# Patient Record
Sex: FEMALE | Race: WHITE
[De-identification: names, ages, dates, MRNs, and addresses within clinical notes are randomized per-mention and may not be internally consistent; named-entity substitution may affect disease eponyms.]

---

## 2021-05-22 ENCOUNTER — HOSPITAL ENCOUNTER (OUTPATIENT)
Dept: HOSPITAL 8 - ED | Age: 71
Setting detail: OBSERVATION
LOS: 1 days | Discharge: HOME | End: 2021-05-23
Attending: INTERNAL MEDICINE | Admitting: INTERNAL MEDICINE
Payer: SELF-PAY

## 2021-05-22 VITALS — SYSTOLIC BLOOD PRESSURE: 119 MMHG | DIASTOLIC BLOOD PRESSURE: 72 MMHG

## 2021-05-22 VITALS — DIASTOLIC BLOOD PRESSURE: 92 MMHG | SYSTOLIC BLOOD PRESSURE: 154 MMHG

## 2021-05-22 VITALS — DIASTOLIC BLOOD PRESSURE: 82 MMHG | SYSTOLIC BLOOD PRESSURE: 166 MMHG

## 2021-05-22 VITALS — SYSTOLIC BLOOD PRESSURE: 99 MMHG | DIASTOLIC BLOOD PRESSURE: 63 MMHG

## 2021-05-22 VITALS — HEIGHT: 65 IN | BODY MASS INDEX: 22.11 KG/M2 | WEIGHT: 132.72 LBS

## 2021-05-22 VITALS — DIASTOLIC BLOOD PRESSURE: 68 MMHG | SYSTOLIC BLOOD PRESSURE: 114 MMHG

## 2021-05-22 DIAGNOSIS — J96.11: ICD-10-CM

## 2021-05-22 DIAGNOSIS — M79.7: ICD-10-CM

## 2021-05-22 DIAGNOSIS — F41.8: ICD-10-CM

## 2021-05-22 DIAGNOSIS — Z87.891: ICD-10-CM

## 2021-05-22 DIAGNOSIS — Z79.02: ICD-10-CM

## 2021-05-22 DIAGNOSIS — I25.2: ICD-10-CM

## 2021-05-22 DIAGNOSIS — Z99.81: ICD-10-CM

## 2021-05-22 DIAGNOSIS — Z95.5: ICD-10-CM

## 2021-05-22 DIAGNOSIS — E78.5: ICD-10-CM

## 2021-05-22 DIAGNOSIS — I10: ICD-10-CM

## 2021-05-22 DIAGNOSIS — M31.30: ICD-10-CM

## 2021-05-22 DIAGNOSIS — Z79.899: ICD-10-CM

## 2021-05-22 DIAGNOSIS — I73.00: ICD-10-CM

## 2021-05-22 DIAGNOSIS — R04.0: Primary | ICD-10-CM

## 2021-05-22 DIAGNOSIS — Z85.41: ICD-10-CM

## 2021-05-22 DIAGNOSIS — D69.9: ICD-10-CM

## 2021-05-22 DIAGNOSIS — I25.10: ICD-10-CM

## 2021-05-22 DIAGNOSIS — R11.0: ICD-10-CM

## 2021-05-22 DIAGNOSIS — J44.9: ICD-10-CM

## 2021-05-22 DIAGNOSIS — Z90.710: ICD-10-CM

## 2021-05-22 LAB
BASOPHILS # BLD AUTO: 0 X10^3/UL (ref 0–0.1)
BASOPHILS NFR BLD AUTO: 1 % (ref 0–1)
EOSINOPHIL # BLD AUTO: 0.1 X10^3/UL (ref 0–0.4)
EOSINOPHIL NFR BLD AUTO: 2 % (ref 1–7)
ERYTHROCYTE [DISTWIDTH] IN BLOOD BY AUTOMATED COUNT: 13 % (ref 9.6–15.2)
LYMPHOCYTES # BLD AUTO: 1.3 X10^3/UL (ref 1–3.4)
LYMPHOCYTES NFR BLD AUTO: 22 % (ref 22–44)
MCH RBC QN AUTO: 33.6 PG (ref 27–34.8)
MCHC RBC AUTO-ENTMCNC: 34 G/DL (ref 32.4–35.8)
MD: NO
MONOCYTES # BLD AUTO: 0.6 X10^3/UL (ref 0.2–0.8)
MONOCYTES NFR BLD AUTO: 10 % (ref 2–9)
NEUTROPHILS # BLD AUTO: 3.9 X10^3/UL (ref 1.8–6.8)
NEUTROPHILS NFR BLD AUTO: 66 % (ref 42–75)
PLATELET # BLD AUTO: 200 X10^3/UL (ref 130–400)
PMV BLD AUTO: 8.1 FL (ref 7.4–10.4)
RBC # BLD AUTO: 3.7 X10^6/UL (ref 3.82–5.3)
TROPONIN I SERPL-MCNC: 0.04 NG/ML (ref 0–0.04)

## 2021-05-22 PROCEDURE — 85025 COMPLETE CBC W/AUTO DIFF WBC: CPT

## 2021-05-22 PROCEDURE — 36415 COLL VENOUS BLD VENIPUNCTURE: CPT

## 2021-05-22 PROCEDURE — 84484 ASSAY OF TROPONIN QUANT: CPT

## 2021-05-22 PROCEDURE — 93005 ELECTROCARDIOGRAM TRACING: CPT

## 2021-05-22 PROCEDURE — G0378 HOSPITAL OBSERVATION PER HR: HCPCS

## 2021-05-22 PROCEDURE — 94640 AIRWAY INHALATION TREATMENT: CPT

## 2021-05-22 PROCEDURE — 99284 EMERGENCY DEPT VISIT MOD MDM: CPT

## 2021-05-22 RX ADMIN — ATORVASTATIN CALCIUM SCH MG: 40 TABLET, FILM COATED ORAL at 10:30

## 2021-05-22 RX ADMIN — CEFDINIR SCH MG: 300 CAPSULE ORAL at 21:08

## 2021-05-22 RX ADMIN — ALBUTEROL SULFATE SCH MG: 2.5 SOLUTION RESPIRATORY (INHALATION) at 20:25

## 2021-05-22 RX ADMIN — PANTOPRAZOLE SODIUM SCH MG: 40 TABLET, DELAYED RELEASE ORAL at 10:30

## 2021-05-22 RX ADMIN — DOXYCYCLINE HYCLATE SCH MG: 100 TABLET, FILM COATED ORAL at 10:31

## 2021-05-22 RX ADMIN — CARVEDILOL SCH MG: 12.5 TABLET, FILM COATED ORAL at 10:31

## 2021-05-22 RX ADMIN — CARVEDILOL SCH MG: 12.5 TABLET, FILM COATED ORAL at 21:09

## 2021-05-22 RX ADMIN — CEFDINIR SCH MG: 300 CAPSULE ORAL at 11:05

## 2021-05-22 RX ADMIN — PAROXETINE SCH MG: 10 TABLET, FILM COATED ORAL at 10:30

## 2021-05-22 RX ADMIN — BUDESONIDE SCH MG: 0.5 INHALANT RESPIRATORY (INHALATION) at 20:25

## 2021-05-22 RX ADMIN — ACETAMINOPHEN PRN MG: 325 TABLET, FILM COATED ORAL at 11:17

## 2021-05-22 RX ADMIN — ACETAMINOPHEN PRN MG: 325 TABLET, FILM COATED ORAL at 18:40

## 2021-05-22 NOTE — NUR
report taken from ROMEO Prado, pt sleeping on gurney, resps even and unlabored. no 
epitaxis noted. care assumed by this RN at this time.

## 2021-05-22 NOTE — NUR
sbar report called to receiving ROMEO Jacobson pt transported to medical telemetry 
without incident.

## 2021-05-22 NOTE — NUR
pt from [facility] for nose bleeds. pt is on blood thinners from recent mi in 
april 12. pt has packing in place, seen by erp, pt gargaling water at this 
point and suction at bedside. erp to reassess when dry blood is clear.

## 2021-05-23 VITALS — SYSTOLIC BLOOD PRESSURE: 127 MMHG | DIASTOLIC BLOOD PRESSURE: 72 MMHG

## 2021-05-23 VITALS — DIASTOLIC BLOOD PRESSURE: 79 MMHG | SYSTOLIC BLOOD PRESSURE: 146 MMHG

## 2021-05-23 VITALS — SYSTOLIC BLOOD PRESSURE: 115 MMHG | DIASTOLIC BLOOD PRESSURE: 67 MMHG

## 2021-05-23 VITALS — SYSTOLIC BLOOD PRESSURE: 149 MMHG | DIASTOLIC BLOOD PRESSURE: 77 MMHG

## 2021-05-23 LAB
BASOPHILS # BLD AUTO: 0.1 X10^3/UL (ref 0–0.1)
BASOPHILS NFR BLD AUTO: 1 % (ref 0–1)
EOSINOPHIL # BLD AUTO: 0.2 X10^3/UL (ref 0–0.4)
EOSINOPHIL NFR BLD AUTO: 3 % (ref 1–7)
ERYTHROCYTE [DISTWIDTH] IN BLOOD BY AUTOMATED COUNT: 13.1 % (ref 9.6–15.2)
LYMPHOCYTES # BLD AUTO: 1.6 X10^3/UL (ref 1–3.4)
LYMPHOCYTES NFR BLD AUTO: 23 % (ref 22–44)
MCH RBC QN AUTO: 33.5 PG (ref 27–34.8)
MCHC RBC AUTO-ENTMCNC: 33.8 G/DL (ref 32.4–35.8)
MD: NO
MONOCYTES # BLD AUTO: 0.5 X10^3/UL (ref 0.2–0.8)
MONOCYTES NFR BLD AUTO: 8 % (ref 2–9)
NEUTROPHILS # BLD AUTO: 4.4 X10^3/UL (ref 1.8–6.8)
NEUTROPHILS NFR BLD AUTO: 65 % (ref 42–75)
PLATELET # BLD AUTO: 188 X10^3/UL (ref 130–400)
PMV BLD AUTO: 8.4 FL (ref 7.4–10.4)
RBC # BLD AUTO: 3.64 X10^6/UL (ref 3.82–5.3)
TROPONIN I SERPL-MCNC: 0.04 NG/ML (ref 0–0.04)

## 2021-05-23 RX ADMIN — ACETAMINOPHEN PRN MG: 325 TABLET, FILM COATED ORAL at 01:33

## 2021-05-23 RX ADMIN — ATORVASTATIN CALCIUM SCH MG: 40 TABLET, FILM COATED ORAL at 09:07

## 2021-05-23 RX ADMIN — ACETAMINOPHEN PRN MG: 325 TABLET, FILM COATED ORAL at 11:17

## 2021-05-23 RX ADMIN — BUDESONIDE SCH MG: 0.5 INHALANT RESPIRATORY (INHALATION) at 08:30

## 2021-05-23 RX ADMIN — DOXYCYCLINE HYCLATE SCH MG: 100 TABLET, FILM COATED ORAL at 09:05

## 2021-05-23 RX ADMIN — ACETAMINOPHEN PRN MG: 325 TABLET, FILM COATED ORAL at 15:50

## 2021-05-23 RX ADMIN — PAROXETINE SCH MG: 10 TABLET, FILM COATED ORAL at 09:06

## 2021-05-23 RX ADMIN — ASPIRIN 81 MG SCH MG: 81 TABLET ORAL at 09:23

## 2021-05-23 RX ADMIN — CEFDINIR SCH MG: 300 CAPSULE ORAL at 09:05

## 2021-05-23 RX ADMIN — PANTOPRAZOLE SODIUM SCH MG: 40 TABLET, DELAYED RELEASE ORAL at 09:06

## 2021-05-23 RX ADMIN — ASPIRIN 81 MG SCH MG: 81 TABLET ORAL at 09:05

## 2021-05-23 RX ADMIN — ACETAMINOPHEN PRN MG: 325 TABLET, FILM COATED ORAL at 06:31

## 2021-05-23 RX ADMIN — CARVEDILOL SCH MG: 12.5 TABLET, FILM COATED ORAL at 09:06

## 2021-05-23 RX ADMIN — ASPIRIN 81 MG SCH MG: 81 TABLET ORAL at 12:26

## 2021-05-23 RX ADMIN — ALBUTEROL SULFATE SCH MG: 2.5 SOLUTION RESPIRATORY (INHALATION) at 08:30

## 2021-05-23 RX ADMIN — NYSTATIN SCH UNITS: 100000 SUSPENSION ORAL at 09:07

## 2021-05-23 RX ADMIN — NYSTATIN SCH UNITS: 100000 SUSPENSION ORAL at 15:03
